# Patient Record
Sex: MALE | Race: WHITE | URBAN - METROPOLITAN AREA
[De-identification: names, ages, dates, MRNs, and addresses within clinical notes are randomized per-mention and may not be internally consistent; named-entity substitution may affect disease eponyms.]

---

## 2018-07-13 ENCOUNTER — OFFICE VISIT (OUTPATIENT)
Dept: OPHTHALMOLOGY | Facility: CLINIC | Age: 28
End: 2018-07-13
Payer: COMMERCIAL

## 2018-07-13 DIAGNOSIS — G43.109 MIGRAINE WITH AURA AND WITHOUT STATUS MIGRAINOSUS, NOT INTRACTABLE: ICD-10-CM

## 2018-07-13 DIAGNOSIS — H52.223 REGULAR ASTIGMATISM OF BOTH EYES: Primary | ICD-10-CM

## 2018-07-13 ASSESSMENT — REFRACTION_MANIFEST
OD_SPHERE: -2.00
OD_SPHERE: -2.75
OS_SPHERE: -2.50
OD_AXIS: 050
OS_CYLINDER: +2.00
OS_CYLINDER: +2.00
OS_SPHERE: -2.25
OD_SPHERE: -2.00
OD_CYLINDER: +1.50
OS_AXIS: 131
OD_AXIS: 050
OS_AXIS: 130
OD_CYLINDER: +1.75
OD_CYLINDER: +2.25
OS_CYLINDER: +2.25
OS_AXIS: 130
OD_AXIS: 049
OS_SPHERE: -2.50
METHOD_AUTOREFRACTION: 1

## 2018-07-13 ASSESSMENT — VISUAL ACUITY
OS_CC+: +2
OS_SC: 20/60
OD_CC: 20/20
OS_CC: 20/25
OD_SC+: +2
OD_SC: 20/40
METHOD: SNELLEN - LINEAR
CORRECTION_TYPE: GLASSES

## 2018-07-13 ASSESSMENT — REFRACTION_WEARINGRX
OD_SPHERE: -1.50
OS_AXIS: 130
OS_SPHERE: -2.25
OD_AXIS: 050
OD_CYLINDER: +1.25
OS_CYLINDER: +1.75
SPECS_TYPE: SVL

## 2018-07-13 ASSESSMENT — EXTERNAL EXAM - LEFT EYE: OS_EXAM: NORMAL

## 2018-07-13 ASSESSMENT — REFRACTION
OS_SPHERE: -2.00
OD_SPHERE: -1.75
OD_CYLINDER: +1.75
OD_AXIS: 049
OS_AXIS: 132
OS_CYLINDER: +2.00

## 2018-07-13 ASSESSMENT — TONOMETRY
IOP_METHOD: ICARE
OD_IOP_MMHG: 16
OS_IOP_MMHG: 14

## 2018-07-13 ASSESSMENT — CONF VISUAL FIELD
OS_NORMAL: 1
METHOD: COUNTING FINGERS
OD_NORMAL: 1

## 2018-07-13 ASSESSMENT — EXTERNAL EXAM - RIGHT EYE: OD_EXAM: NORMAL

## 2018-07-13 ASSESSMENT — CUP TO DISC RATIO
OD_RATIO: 0.2
OS_RATIO: 0.2

## 2018-07-13 ASSESSMENT — SLIT LAMP EXAM - LIDS
COMMENTS: NORMAL
COMMENTS: NORMAL

## 2018-07-13 NOTE — MR AVS SNAPSHOT
After Visit Summary   2018    Anand Hemphill    MRN: [id number]           Patient Information     Date Of Birth          1990        Visit Information        Provider Department      2018 2:20 PM Humberto Shelley, MARLEE M [facility] Ophthalmology        Today's Diagnoses     Regular astigmatism of both eyes    -  1    Migraine with aura and without status migrainosus, not intractable           Follow-ups after your visit        Follow-up notes from your care team     Return in about 1 year (around 2019) for Comprehensive Eye Exam.      Who to contact     Please call your clinic at [phone] to:    Ask questions about your health    Make or cancel appointments    Discuss your medicines    Learn about your test results    Speak to your doctor            Additional Information About Your Visit        MyChart Information     Wakozi is an electronic gateway that provides easy, online access to your medical records. With Wakozi, you can request a clinic appointment, read your test results, renew a prescription or communicate with your care team.     To sign up for Wakozi visit the website at [email or web address]   You will be asked to enter the access code listed below, as well as some personal information. Please follow the directions to create your username and password.     Your access code is: [email or web address]  Expires: 10/2/2018  6:31 AM     Your access code will  in 90 days. If you need help or a new code, please contact your [facility] or call [phone] for assistance.        Care EveryWhere ID     This is your Care EveryWhere ID. This could be used by other organizations to access your [facility] medical records  [id number]         Blood Pressure from Last 3 Encounters:   No data found for BP    Weight from Last 3 Encounters:   No data found for Wt              We Performed the Following     REFRACTION [88447]        Primary Care Provider     None Specified       No primary provider on file.        Equal Access to Services     RD [place] : Hadii aad ku haderrolaurelia Shook, walishatamara chan, vishmagnus degrootmajose luis arreola. So [facility] [phone].    ATENCIÓN: Si habla español, tiene a kendall disposición servicios gratuitos de asistencia lingüística. Llame al [phone].    We comply with applicable federal civil rights laws and [place] laws. We do not discriminate on the basis of race, color, national origin, age, disability, sex, sexual orientation, or gender identity.            Thank you!     Thank you for choosing [facility] OPHTHALMOLOGY  for your care. Our goal is always to provide you with excellent care. Hearing back from our patients is one way we can continue to improve our services. Please take a few minutes to complete the written survey that you may receive in the mail after your visit with us. Thank you!             Your Updated Medication List - Protect others around you: Learn how to safely use, store and throw away your medicines at www.disposemymeds.org.          This list is accurate as of 7/13/18  3:13 PM.  Always use your most recent med list.                   Brand Name Dispense Instructions for use Diagnosis    aspirin-acetaminophen-caffeine 250-250-65 MG per tablet    EXCEDRIN MIGRAINE     Take 1 tablet by mouth every 6 hours as needed

## 2018-07-13 NOTE — NURSING NOTE
Chief Complaints and History of Present Illnesses   Patient presents with     Blurred Vision Both Eyes     HPI    Affected eye(s):  Both   Symptoms:     Blurred vision   No floaters   No flashes   No tearing   No Dryness         Do you have eye pain now?:  No      Comments:  Patient notes that he gets migraines 4/7 days per week, distance vision is blurred.     Shruthi Reed July 13, 2018 2:18 PM

## 2018-07-13 NOTE — PROGRESS NOTES
Assessment/Plan  (H52.223) Regular astigmatism of both eyes  (primary encounter diagnosis)  Comment: Myopic astigmatism OU  Plan: REFRACTION [93377]         Educated patient on condition and clinical findings. Dispensed spectacle prescription for full time wear. Educated patient on possibility of adaptation period, if symptoms do not improve return to clinic for further testing.    (G43.109) Migraine with aura and without status migrainosus, not intractable  Comment: Longstanding, controlled with Excedrin  Plan:  Recommended talking with primary care provider if symptoms persist. May be improved with prescription glasses.    Return to clinic in 1 year for comprehensive eye exam.    Complete documentation of historical and exam elements from today's encounter can  be found in the full encounter summary report (not reduplicated in this progress  note). I personally obtained the chief complaint(s) and history of present illness. I  confirmed and edited as necessary the review of systems, past medical/surgical  history, family history, social history, and examination findings as documented by  others; and I examined the patient myself. I personally reviewed the relevant tests,  images, and reports as documented above. I formulated and edited as necessary the  assessment and plan and discussed the findings and management plan with the  patient and family.    Humberto Shelley, MARLEE, FAAO

## 2019-01-14 ENCOUNTER — ANCILLARY PROCEDURE (OUTPATIENT)
Dept: MRI IMAGING | Facility: CLINIC | Age: 29
End: 2019-01-14
Payer: COMMERCIAL

## 2019-01-14 ENCOUNTER — OFFICE VISIT (OUTPATIENT)
Dept: ORTHOPEDICS | Facility: CLINIC | Age: 29
End: 2019-01-14
Payer: COMMERCIAL

## 2019-01-14 ENCOUNTER — ANCILLARY PROCEDURE (OUTPATIENT)
Dept: GENERAL RADIOLOGY | Facility: CLINIC | Age: 29
End: 2019-01-14
Payer: COMMERCIAL

## 2019-01-14 VITALS — BODY MASS INDEX: 29.06 KG/M2 | WEIGHT: 203 LBS | RESPIRATION RATE: 16 BRPM | HEIGHT: 70 IN

## 2019-01-14 DIAGNOSIS — M25.561 PAIN AND SWELLING OF RIGHT KNEE: Primary | ICD-10-CM

## 2019-01-14 DIAGNOSIS — M25.461 PAIN AND SWELLING OF RIGHT KNEE: ICD-10-CM

## 2019-01-14 DIAGNOSIS — M25.461 PAIN AND SWELLING OF RIGHT KNEE: Primary | ICD-10-CM

## 2019-01-14 DIAGNOSIS — M25.561 PAIN AND SWELLING OF RIGHT KNEE: ICD-10-CM

## 2019-01-14 RX ADMIN — LIDOCAINE HYDROCHLORIDE 3 ML: 10 INJECTION, SOLUTION EPIDURAL; INFILTRATION; INTRACAUDAL; PERINEURAL at 16:37

## 2019-01-14 ASSESSMENT — MIFFLIN-ST. JEOR: SCORE: 1897.05

## 2019-01-14 NOTE — PROGRESS NOTES
Large Joint Injection/Arthocentesis: R knee joint  Date/Time: 1/14/2019 4:37 PM  Performed by: Erick Gibbons MD  Authorized by: Erick Gibbons MD     Indications:  Pain and joint swelling  Needle Size:  18 G  Approach:  Superolateral  Location:  Knee  Site:  R knee joint  Medications:  3 mL lidocaine (PF) 1 %  Aspirate amount (mL):  10  Aspirate:  Bloody  Outcome:  Tolerated well, no immediate complications  Procedure discussed: discussed risks, benefits, and alternatives    Consent Given by:  Patient  Timeout: timeout called immediately prior to procedure    Prep: patient was prepped and draped in usual sterile fashion     landmarks were identified and marked with a pen.  Area was cleaned with chlorhexidine swab. 3 ML lidocaine was used for local anesthesia in the superior lateral portion of the knee.  An 18-gauge needle was easily introduced into the knee joint aspiration resulted in 10 mL blood.  The area was covered with a bandage and wrapped in Ace wrap.  Blood loss minimal.  Patient tolerated procedure well.  Given routine post injection instructions.    Erick Gibbons MD
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
## 2019-01-14 NOTE — LETTER
Date:January 16, 2019      Patient was self referred, no letter generated. Do not send.        [facility] Information

## 2019-01-14 NOTE — LETTER
Patient:  Anand Hemphill  :   1990  MRN:     [id number]      January 15, 2019    To whom it may concern:    Anand Hemphill is under my professional care for   Right knee fracture and may return to work on 1/15/2019   with the following restrictions:     -Will need to use crutches and immobilizer for the next 4 weeks.       Please contact our office with questions or concerns.     Sincerely,        Erick Gibbons MD
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
## 2019-01-15 ENCOUNTER — TELEPHONE (OUTPATIENT)
Dept: ORTHOPEDICS | Facility: CLINIC | Age: 29
End: 2019-01-15

## 2019-01-15 DIAGNOSIS — M25.561 PAIN OF RIGHT KNEE AFTER INJURY: Primary | ICD-10-CM

## 2019-01-15 RX ORDER — LIDOCAINE HYDROCHLORIDE 10 MG/ML
3 INJECTION, SOLUTION EPIDURAL; INFILTRATION; INTRACAUDAL; PERINEURAL
Status: SHIPPED | OUTPATIENT
Start: 2019-01-14

## 2019-01-15 RX ORDER — OXYCODONE AND ACETAMINOPHEN 5; 325 MG/1; MG/1
1-2 TABLET ORAL EVERY 6 HOURS PRN
Qty: 15 TABLET | Refills: 0 | Status: SHIPPED | OUTPATIENT
Start: 2019-01-15 | End: 2019-01-18

## 2019-01-15 NOTE — TELEPHONE ENCOUNTER
[facility] Call Center    Phone Message    May a detailed message be left on voicemail: yes    Reason for Call: Requesting Results   Name/type of test: MRI   Date of test: 1/14/19  Was test done at a location other than [facility] ?: No,   Pt is also  in a lot of pain and stated he is probably taking to much ibuprofen, and would like to know what he can do for pain management.      Action Taken: Message routed to:  [facility] walk [facility] ortho

## 2019-01-15 NOTE — TELEPHONE ENCOUNTER
[facility] Call Center    Phone Message    May a detailed message be left on voicemail: yes    Reason for Call: Other: Pt requested another message be sent over, pt needs to know what to do for his knee pain for work, as well as his MRI results     Action Taken: Message routed to:  [facility] ortho walk in

## 2019-01-15 NOTE — TELEPHONE ENCOUNTER
Discussed results with patient over the phone. Recommended crutches and immobilizer. Given letter for work. Will come by clinic to  crutches and immobilizer as well as rx for percocet.

## 2019-01-22 ENCOUNTER — COMMUNICATION - HEALTHEAST (OUTPATIENT)
Dept: FAMILY MEDICINE | Facility: CLINIC | Age: 29
End: 2019-01-22

## 2019-01-23 DIAGNOSIS — M25.561 PAIN AND SWELLING OF RIGHT KNEE: Primary | ICD-10-CM

## 2019-01-23 DIAGNOSIS — M25.461 PAIN AND SWELLING OF RIGHT KNEE: Primary | ICD-10-CM

## 2019-01-23 RX ORDER — OXYCODONE AND ACETAMINOPHEN 5; 325 MG/1; MG/1
1-2 TABLET ORAL EVERY 6 HOURS PRN
Qty: 15 TABLET | Refills: 0 | Status: SHIPPED | OUTPATIENT
Start: 2019-01-23

## 2019-01-23 RX ORDER — OXYCODONE AND ACETAMINOPHEN 5; 325 MG/1; MG/1
1-2 TABLET ORAL EVERY 6 HOURS PRN
Qty: 60 TABLET | Refills: 0 | Status: SHIPPED | OUTPATIENT
Start: 2019-01-23 | End: 2019-01-23

## 2019-02-08 ENCOUNTER — TELEPHONE (OUTPATIENT)
Dept: ORTHOPEDICS | Facility: CLINIC | Age: 29
End: 2019-02-08

## 2019-02-14 ENCOUNTER — OFFICE VISIT (OUTPATIENT)
Dept: ORTHOPEDICS | Facility: CLINIC | Age: 29
End: 2019-02-14
Payer: COMMERCIAL

## 2019-02-14 ENCOUNTER — TELEPHONE (OUTPATIENT)
Dept: ORTHOPEDICS | Facility: CLINIC | Age: 29
End: 2019-02-14

## 2019-02-14 VITALS — HEIGHT: 70 IN | WEIGHT: 203 LBS | HEART RATE: 85 BPM | BODY MASS INDEX: 29.06 KG/M2

## 2019-02-14 DIAGNOSIS — S72.434D CLOSED NONDISPLACED FRACTURE OF MEDIAL CONDYLE OF RIGHT FEMUR WITH ROUTINE HEALING, SUBSEQUENT ENCOUNTER: Primary | ICD-10-CM

## 2019-02-14 ASSESSMENT — MIFFLIN-ST. JEOR: SCORE: 1897.05

## 2019-02-14 NOTE — TELEPHONE ENCOUNTER
Letter written and emailed to provided address. Attempted to contact pt to let him know but unable to leave VM.

## 2019-02-14 NOTE — TELEPHONE ENCOUNTER
[facility] Call Center    Phone Message    May a detailed message be left on voicemail: yes    Reason for Call: Form or Letter   Type or form/letter needing completion: Return to work notice- statement indicating that it is ok to return to work.  Provider: Dr. Gibbons  Date form needed: ASAP  Once completed: Fax form to: Pt prefers email [email or web address]      Action Taken: Message routed to:  [facility] ump ortho walk in clinic
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
## 2019-02-14 NOTE — PROGRESS NOTES
SPORTS & ORTHOPEDIC WALK-IN FOLLOW-UP VISIT 2/14/2019    Interval History:     Follow up reason: R knee- medial femoral condyle impaction fracture    Date of injury: 1/12/19 fall    Date last seen: 1/14/19    Following Therapeutic Plan: Has not been using crutches for awhile d/t improving pain.     Pain: Improving    Function: Improving    Interval History:      Medical History:    Any recent changes to your medical history? No    Any new medication prescribed since last visit? No    Review of Systems:    Do you have fever, chills, weight loss? No    Do you have any vision problems? No    Do you have any chest pain or edema? No    Do you have any shortness of breath or wheezing?  No    Do you have stomach problems? No    Do you have any numbness or focal weakness? No    Do you have diabetes? No    Do you have problems with bleeding or clotting? No    Do you have an rashes or other skin lesions? No

## 2019-02-14 NOTE — LETTER
[facility] ORTHOPAEDIC CLINIC  [address] [place] [facility] [address]          February 14, 2019    RE:  Anand Hemphill                                                                                                                                                       [address]  [place] [address]            To whom it may concern:    Anand Hemphill is under my professional care for a right knee injury. He is able to return to work, but should avoid any running, jumping, or high impact activity. Please contact our clinic with any questions or concerns.       Sincerely,        Erick Gibbons MD

## 2019-04-02 ENCOUNTER — OFFICE VISIT (OUTPATIENT)
Dept: OPHTHALMOLOGY | Facility: CLINIC | Age: 29
End: 2019-04-02
Payer: COMMERCIAL

## 2019-04-02 DIAGNOSIS — H52.223 REGULAR ASTIGMATISM OF BOTH EYES: Primary | ICD-10-CM

## 2019-04-02 ASSESSMENT — REFRACTION_CURRENTRX
OD_CYLINDER: -1.25
OS_AXIS: 040
OD_BASECURVE: 8.7
OD_DIAMETER: 14.5
OD_BRAND: COOPER BIOFINITY TORIC BC 8.7, D 14.5
OS_DIAMETER: 14.5
OS_BRAND: COOPER BIOFINITY TORIC BC 8.7, D 14.5
OS_CYLINDER: -1.75
OD_SPHERE: -0.50
OS_SPHERE: -0.50
OS_BASECURVE: 8.7
OD_AXIS: 140

## 2019-04-02 ASSESSMENT — VISUAL ACUITY
METHOD: SNELLEN - LINEAR
OS_CC+: -1
OS_CC: 20/20
CORRECTION_TYPE: CONTACTS
OD_CC+: -2
OD_CC: 20/20

## 2019-04-02 ASSESSMENT — TONOMETRY: IOP_UNABLETOASSESS: 1

## 2019-04-02 NOTE — PROGRESS NOTES
History  HPI     Contact Lens Evaluation     In both eyes.  Associated symptoms include Negative for dryness, eye pain and tearing.  Pain was noted as 0/10.              Comments     Shruthi Reed April 2, 2019 11:43 AM            Last edited by Tamara Reed on 4/2/2019 11:43 AM. (History)          Assessment/Plan  (H52.223) Regular astigmatism of both eyes  (primary encounter diagnosis)  Comment: Myopic astigmatism both eyes   Plan: HC CONTACT LENS FITTING COSMETIC LVL 1 (43404.011)         Dispensed trial lenses and finalized contact lens prescription for 2 years.    Return to clinic as needed for contact lens follow-up.    Contact Lens Billing  V-Code:  - Soft toric  Final Contact Lens Rx       Brand Base Curve Diameter Sphere Cylinder Axis    Right Magdiel Biofinity Toric BC 8.7, D 14.5 8.7 14.5 -0.50 -1.25 140    Left Magdiel Biofinity Toric BC 8.7, D 14.5 8.7 14.5 -0.25 -1.75 040    Expiration Date:  4/2/2021    Replacement:  Monthly    Wearing Schedule:  Daily wear         CL Fitting Fee: $75    These are for cosmetic contact lenses.    Encounter Diagnosis   Name Primary?     Regular astigmatism of both eyes Yes        Complete documentation of historical and exam elements from today's encounter can  be found in the full encounter summary report (not reduplicated in this progress  note). I personally obtained the chief complaint(s) and history of present illness. I  confirmed and edited as necessary the review of systems, past medical/surgical  history, family history, social history, and examination findings as documented by  others; and I examined the patient myself. I personally reviewed the relevant tests,  images, and reports as documented above. I formulated and edited as necessary the  assessment and plan and discussed the findings and management plan with the  patient and family.    Humberto Shelley, OD, FAAO

## 2019-04-02 NOTE — NURSING NOTE
Chief Complaints and History of Present Illnesses   Patient presents with     Contact Lens Evaluation     Chief Complaint(s) and History of Present Illness(es)     Contact Lens Evaluation     Laterality: both eyes    Associated symptoms: Negative for dryness, eye pain and tearing    Pain scale: 0/10              Comments     Shruthi Reed April 2, 2019 11:43 AM

## 2019-10-24 ENCOUNTER — OFFICE VISIT (OUTPATIENT)
Dept: OPHTHALMOLOGY | Facility: CLINIC | Age: 29
End: 2019-10-24

## 2019-10-24 DIAGNOSIS — H52.13 MYOPIA OF BOTH EYES: Primary | ICD-10-CM

## 2019-10-24 NOTE — PROGRESS NOTES
Contact Lens Billing  V-Code:  - Soft toric     # of units: 2  Price per Unit: $70  Total $140     [facility] Order #[id number]    These are for cosmetic contact lenses.    Encounter Diagnosis   Name Primary?     Myopia of both eyes Yes                   Final Contact Lens Rx        Brand Base Curve Diameter Sphere Cylinder Axis     Right Magdiel Biofinity Toric BC 8.7, D 14.5 8.7 14.5 -0.50 -1.25 140     Left Magdiel Biofinity Toric BC 8.7, D 14.5 8.7 14.5 -0.25 -1.75 040     Expiration Date:  4/2/2021     Replacement:  Monthly     Wearing Schedule:  Daily wear     Date of last eye exam: 4/2/2019    BALTA Carrasco COT 8:48 AM October 24, 2019

## 2020-01-08 ENCOUNTER — OFFICE VISIT - HEALTHEAST (OUTPATIENT)
Dept: FAMILY MEDICINE | Facility: CLINIC | Age: 30
End: 2020-01-08

## 2020-01-08 DIAGNOSIS — Z72.0 TOBACCO ABUSE: ICD-10-CM

## 2020-01-08 DIAGNOSIS — F41.1 GAD (GENERALIZED ANXIETY DISORDER): ICD-10-CM

## 2020-01-08 LAB
ANION GAP SERPL CALCULATED.3IONS-SCNC: 12 MMOL/L (ref 5–18)
BASOPHILS # BLD AUTO: 0 THOU/UL (ref 0–0.2)
BASOPHILS NFR BLD AUTO: 1 % (ref 0–2)
BUN SERPL-MCNC: 16 MG/DL (ref 8–22)
CALCIUM SERPL-MCNC: 9.6 MG/DL (ref 8.5–10.5)
CHLORIDE BLD-SCNC: 107 MMOL/L (ref 98–107)
CO2 SERPL-SCNC: 21 MMOL/L (ref 22–31)
CREAT SERPL-MCNC: 0.83 MG/DL (ref 0.7–1.3)
EOSINOPHIL # BLD AUTO: 0.2 THOU/UL (ref 0–0.4)
EOSINOPHIL NFR BLD AUTO: 3 % (ref 0–6)
ERYTHROCYTE [DISTWIDTH] IN BLOOD BY AUTOMATED COUNT: 11.3 % (ref 11–14.5)
GFR SERPL CREATININE-BSD FRML MDRD: >60 ML/MIN/1.73M2
GLUCOSE BLD-MCNC: 81 MG/DL (ref 70–125)
HCT VFR BLD AUTO: 51.4 % (ref 40–54)
HGB BLD-MCNC: 17.1 G/DL (ref 14–18)
LYMPHOCYTES # BLD AUTO: 2.1 THOU/UL (ref 0.8–4.4)
LYMPHOCYTES NFR BLD AUTO: 33 % (ref 20–40)
MCH RBC QN AUTO: 31.2 PG (ref 27–34)
MCHC RBC AUTO-ENTMCNC: 33.3 G/DL (ref 32–36)
MCV RBC AUTO: 94 FL (ref 80–100)
MONOCYTES # BLD AUTO: 0.7 THOU/UL (ref 0–0.9)
MONOCYTES NFR BLD AUTO: 12 % (ref 2–10)
NEUTROPHILS # BLD AUTO: 3.2 THOU/UL (ref 2–7.7)
NEUTROPHILS NFR BLD AUTO: 51 % (ref 50–70)
PLATELET # BLD AUTO: 190 THOU/UL (ref 140–440)
PMV BLD AUTO: 8.6 FL (ref 7–10)
POTASSIUM BLD-SCNC: 4.5 MMOL/L (ref 3.5–5)
RBC # BLD AUTO: 5.48 MILL/UL (ref 4.4–6.2)
SODIUM SERPL-SCNC: 140 MMOL/L (ref 136–145)
TSH SERPL DL<=0.005 MIU/L-ACNC: 0.67 UIU/ML (ref 0.3–5)
WBC: 6.2 THOU/UL (ref 4–11)

## 2020-01-08 ASSESSMENT — MIFFLIN-ST. JEOR: SCORE: 1993.65

## 2020-01-08 ASSESSMENT — PATIENT HEALTH QUESTIONNAIRE - PHQ9: SUM OF ALL RESPONSES TO PHQ QUESTIONS 1-9: 6

## 2020-01-08 ASSESSMENT — ANXIETY QUESTIONNAIRES
3. WORRYING TOO MUCH ABOUT DIFFERENT THINGS: MORE THAN HALF THE DAYS
GAD7 TOTAL SCORE: 18
5. BEING SO RESTLESS THAT IT IS HARD TO SIT STILL: NEARLY EVERY DAY
1. FEELING NERVOUS, ANXIOUS, OR ON EDGE: MORE THAN HALF THE DAYS
4. TROUBLE RELAXING: NEARLY EVERY DAY
7. FEELING AFRAID AS IF SOMETHING AWFUL MIGHT HAPPEN: MORE THAN HALF THE DAYS
IF YOU CHECKED OFF ANY PROBLEMS ON THIS QUESTIONNAIRE, HOW DIFFICULT HAVE THESE PROBLEMS MADE IT FOR YOU TO DO YOUR WORK, TAKE CARE OF THINGS AT HOME, OR GET ALONG WITH OTHER PEOPLE: SOMEWHAT DIFFICULT
6. BECOMING EASILY ANNOYED OR IRRITABLE: NEARLY EVERY DAY
2. NOT BEING ABLE TO STOP OR CONTROL WORRYING: NEARLY EVERY DAY

## 2020-01-09 ENCOUNTER — COMMUNICATION - HEALTHEAST (OUTPATIENT)
Dept: FAMILY MEDICINE | Facility: CLINIC | Age: 30
End: 2020-01-09

## 2020-04-22 ENCOUNTER — OFFICE VISIT (OUTPATIENT)
Dept: OPHTHALMOLOGY | Facility: CLINIC | Age: 30
End: 2020-04-22
Payer: COMMERCIAL

## 2020-04-22 DIAGNOSIS — H52.13 MYOPIA OF BOTH EYES: Primary | ICD-10-CM

## 2020-04-22 NOTE — PROGRESS NOTES
No office visit. Bill contact lens    Contact Lens Billing  V-Code:  - Soft toric     # of units: 2  Price per Unit: $70  Total $140     Shipping Waived    [facility] order # [id number]    These are for cosmetic contact lenses.    Encounter Diagnosis   Name Primary?     Myopia of both eyes Yes        Date of last eye exam: 4/2/2019                        Final Contact Lens Rx        Brand Base Curve Diameter Sphere Cylinder Axis      Right Magdiel Biofinity Toric BC 8.7, D 14.5 8.7 14.5 -0.50 -1.25 140      Left Magdiel Biofinity Toric BC 8.7, D 14.5 8.7 14.5 -0.25 -1.75 040      Expiration Date:  4/2/2021     Replacement:  Monthly     Wearing Schedule:  Daily wear     Stacy Dorado COT 8:21 AM April 22, 2020

## 2020-09-04 ENCOUNTER — OFFICE VISIT (OUTPATIENT)
Dept: OPHTHALMOLOGY | Facility: CLINIC | Age: 30
End: 2020-09-04

## 2020-09-04 DIAGNOSIS — H52.13 MYOPIA OF BOTH EYES: Primary | ICD-10-CM

## 2020-09-04 NOTE — Clinical Note
Raul Vasquez,     First time doing this type of encounter, I hope it looks all correct to you, please bill patient for CTL.  Call if you have any questions or see an error, my number is [phone]    Thanks!  Shruthi GIFFORD September 4, 2020 12:05 PM

## 2020-09-04 NOTE — PROGRESS NOTES
No office visit. Bill contact lens    Contact Lens Billing  V-Code:  - Soft toric     # of units: 2   Price per Unit: $70  Total: $140    Shipping Waived    [facility] order # [id number]    These are for cosmetic contact lenses.     Encounter Diagnosis   Name Primary?     Myopia of both eyes - Both Eyes Yes      Date of last eye exam: 04/02/2019    Final Contact Lens RX:  Right: Magdiel Biofinity Toric, BC: 8.7, Diameter: 14.5, -0.50-1.25x140  Left: Magdiel Biofinity Toric, BC: 8.7, Diameter: 14.5, -0.25-1.75x040  Expiration: 04/02/2021  Replacement: monthly  Wearing Schedule: daily wear    Shruthi Reed COT September 4, 2020 12:02 PM

## 2021-02-03 ENCOUNTER — OFFICE VISIT (OUTPATIENT)
Dept: OPHTHALMOLOGY | Facility: CLINIC | Age: 31
End: 2021-02-03

## 2021-02-03 DIAGNOSIS — H52.13 MYOPIA OF BOTH EYES: Primary | ICD-10-CM

## 2021-02-03 DIAGNOSIS — H52.229 REGULAR ASTIGMATISM: ICD-10-CM

## 2021-02-03 PROCEDURE — V2521 CNTCT LENS HYDROPHILIC TORIC: HCPCS | Performed by: OPTOMETRIST

## 2021-02-04 NOTE — PROGRESS NOTES
No office visit. CL order only.    Contact Lens Billing  V-Code:  - Soft toric     # of units: 2  Price per Unit: $70  Total: $140    [facility] order number: [id number]    These are for cosmetic contact lenses.    Encounter Diagnoses   Name Primary?     Myopia of both eyes Yes     Regular astigmatism         Contact Lens Billing  V-Code:  - Soft toric             Final Contact Lens Rx        Brand Base Curve Diameter Sphere Cylinder Axis     Right Magdiel Biofinity Toric BC 8.7, D 14.5 8.7 14.5 -0.50 -1.25 140     Left Magdiel Biofinity Toric BC 8.7, D 14.5 8.7 14.5 -0.25 -1.75 040     Expiration Date:  4/2/2021     Replacement:  Monthly     Wearing Schedule:  Daily wear       Date of last eye exam: 4/2/2019.    BALTA Carrasco COT 11:45 AM February 4, 2021

## 2021-05-27 ASSESSMENT — PATIENT HEALTH QUESTIONNAIRE - PHQ9: SUM OF ALL RESPONSES TO PHQ QUESTIONS 1-9: 6

## 2021-05-28 ASSESSMENT — ANXIETY QUESTIONNAIRES: GAD7 TOTAL SCORE: 18

## 2021-06-03 ENCOUNTER — TELEPHONE (OUTPATIENT)
Dept: OPHTHALMOLOGY | Facility: CLINIC | Age: 31
End: 2021-06-03

## 2021-06-04 VITALS
WEIGHT: 223 LBS | BODY MASS INDEX: 31.22 KG/M2 | OXYGEN SATURATION: 97 % | HEART RATE: 81 BPM | DIASTOLIC BLOOD PRESSURE: 66 MMHG | SYSTOLIC BLOOD PRESSURE: 137 MMHG | HEIGHT: 71 IN

## 2021-06-05 NOTE — PROGRESS NOTES
Assessment:   1. BALTAZAR (generalized anxiety disorder)  Discussed diagnosis and possible treatment options with patient.  Answered questions.  - Basic metabolic panel  - CBC and differential  - Thyroid [place]  - Ambulatory referral to Psychology  - HM1 (CBC with Diff)  - ALPRAZolam (XANAX) 0.5 MG tablet; Take 1 tablet (0.5 mg total) by mouth 3 (three) times a day as needed for sleep or anxiety.  Dispense: 10 tablet; Refill: 0  - buPROPion (WELLBUTRIN XL) 150 MG 24 hr tablet; Take 1 tablet (150 mg total) by mouth every morning.  Dispense: 7 tablet; Refill: 2  - buPROPion (WELLBUTRIN XL) 300 MG 24 hr tablet; Take 1 tablet (300 mg total) by mouth every morning.  Dispense: 30 tablet; Refill: 2    2. Tobacco abuse  Discussed diagnosis and treatment options with patient and start treatment.  - buPROPion (WELLBUTRIN XL) 150 MG 24 hr tablet; Take 1 tablet (150 mg total) by mouth every morning.  Dispense: 7 tablet; Refill: 2  - buPROPion (WELLBUTRIN XL) 300 MG 24 hr tablet; Take 1 tablet (300 mg total) by mouth every morning.  Dispense: 30 tablet; Refill: 2    Plan:   Medications: Wellbutrin and Xanax.  Labs: Comprehensive metabolic profile, CBC and TSH.  Recommended counseling.  Reviewed concept of anxiety as biochemical imbalance of neurotransmitters and rationale for treatment.  Instructed patient to contact office or on-call physician promptly should condition worsen or any new symptoms appear and provided on-call telephone numbers. IF THE PATIENT HAS ANY SUICIDAL OR HOMICIDAL IDEATIONS, CALL THE OFFICE, DISCUSS WITH A SUPPORT MEMBER, OR GO TO THE ER IMMEDIATELY. Patient was agreeable with this plan.  Follow up: 2 weeks.  Spent 45 minutes (>50% of visit) discussing the risks of anxiety disorder, the  pathophysiology, etiology, risks, and principles of treatment.     Subjective:   Anand Hemphill is a 30 y.o. male who presents for new evaluation and treatment of anxiety disorder. He has the following anxiety symptoms: chest  "pain, difficulty concentrating, insomnia, irritable, palpitations, panic attacks, shortness of breath and sweating. Onset of symptoms was approximately 3 years ago. Symptoms have been gradually worsening since that time. He reports that he recently got promoted at work which has been stressful. He also reports that his his neighbors are dealing drugs and every time he is trying to sleep people are going in and out of the house which is increasing his anxiety.  He denies current suicidal and homicidal ideation. Family history significant for anxiety. Risk factors: positive family history in  mother. Previous treatment includes Xanax. He complains of the following medication side effects: none. Patient also reports that he will like to quit smoking, he smokes one and half packs a day.    The following portions of the patient's history were reviewed and updated as appropriate: allergies, current medications, past family history, past medical history, past social history, past surgical history and problem list.    Review of Systems  A 12 point comprehensive review of systems was negative except as noted.      Objective:   /66   Pulse 81   Ht 5' 11\" (1.803 m)   Wt (!) 223 lb (101.2 kg)   SpO2 97%   BMI 31.10 kg/m    General appearance: alert, appears stated age and cooperative  Pulses: 2+ and symmetric  Skin: Skin color, texture, turgor normal. No rashes or lesions  Lymph nodes: Cervical, supraclavicular, and axillary nodes normal.  Neurologic: Grossly normal      "

## 2021-06-05 NOTE — PATIENT INSTRUCTIONS - HE
2020     Dear Anand Hemphill:    Thank you for your interest in [employer]. [employer] has a new electronic health record to help you manage your health information using your computer or the [employer] Tanika.    With [employer] you can review instructions from your health care provider, send a secure message to your provider, view test results, manage your appointments and more.    How Do I Sign Up?  1. In your Internet browser, go to [email or web address]  2. Click on Sign Up Now   3. Enter your [employer] Activation Code exactly as it appears below. This code will  14 days after it is generated. You will not need to use this code after you have completed the sign-up process. If you do not sign up before the expiration date, you must request a new code.   [employer] Activation Code: [email or web address]  Expires: 3/8/2020 11:09 AM  4. Create a [employer] username. Think of one that is secure and easy to remember.  Your username must be between 6 and 20 characters.  5. Create a [employer] password. You can change your password at any time. Your password must be between 8 and 20 characters and contain at least one letter and one number.  6. Choose a security question, enter your answer, and click Next. This can be used to access [employer] if you forget your password.   7. Enter a valid e-mail address to receive e-mail notifications when new information is available in [employer].  8. Click Sign In.     Additional Information  If you have questions, visit [email or web address], e-mail [email or web address] or call [phone] to talk to our [employer] staff. Remember, [employer] is NOT to be used for urgent needs. For medical emergencies, dial 911.

## 2021-06-16 PROBLEM — Z72.0 TOBACCO ABUSE: Status: ACTIVE | Noted: 2020-01-08

## 2021-06-20 NOTE — LETTER
Letter by Brittani Palomino FNP at      Author: Brittani Palomino FNP Service: -- Author Type: --    Filed:  Encounter Date: 1/9/2020 Status: Signed         Anand Hemphill  [address] [place] [address]             January 9, 2020         Dear Mr. Hemphill,    Below are the results from your recent visit:    Resulted Orders   Basic metabolic panel   Result Value Ref Range    Sodium 140 136 - 145 mmol/L    Potassium 4.5 3.5 - 5.0 mmol/L    Chloride 107 98 - 107 mmol/L    CO2 21 (L) 22 - 31 mmol/L    Anion Gap, Calculation 12 5 - 18 mmol/L    Glucose 81 70 - 125 mg/dL    Calcium 9.6 8.5 - 10.5 mg/dL    BUN 16 8 - 22 mg/dL    Creatinine 0.83 0.70 - 1.30 mg/dL    GFR MDRD Af Amer >60 >60 mL/min/1.73m2    GFR MDRD Non Af Amer >60 >60 mL/min/1.73m2    Narrative    Fasting Glucose reference range is 70-99 mg/dL per  American Diabetes Association (ADA) guidelines.   Thyroid [place]   Result Value Ref Range    TSH 0.67 0.30 - 5.00 uIU/mL   HM1 (CBC with Diff)   Result Value Ref Range    WBC 6.2 4.0 - 11.0 thou/uL    RBC 5.48 4.40 - 6.20 mill/uL    Hemoglobin 17.1 14.0 - 18.0 g/dL    Hematocrit 51.4 40.0 - 54.0 %    MCV 94 80 - 100 fL    MCH 31.2 27.0 - 34.0 pg    MCHC 33.3 32.0 - 36.0 g/dL    RDW 11.3 11.0 - 14.5 %    Platelets 190 140 - 440 thou/uL    MPV 8.6 7.0 - 10.0 fL    Neutrophils % 51 50 - 70 %    Lymphocytes % 33 20 - 40 %    Monocytes % 12 (H) 2 - 10 %    Eosinophils % 3 0 - 6 %    Basophils % 1 0 - 2 %    Neutrophils Absolute 3.2 2.0 - 7.7 thou/uL    Lymphocytes Absolute 2.1 0.8 - 4.4 thou/uL    Monocytes Absolute 0.7 0.0 - 0.9 thou/uL    Eosinophils Absolute 0.2 0.0 - 0.4 thou/uL    Basophils Absolute 0.0 0.0 - 0.2 thou/uL       Normal lab result. Continue to exercise regularly and consume heart healthy diet.     Please call with questions or contact us using [employer].    Sincerely,        Electronically signed by THELMA Mello

## 2021-06-23 NOTE — TELEPHONE ENCOUNTER
Attempted to contact patient and inform him Dr Cisneros is not in Private Practice and cannot be his primary care MD.  Received message asking for a mail box number.  Unable to leave a message at this time.  Will try again later.

## 2021-06-23 NOTE — TELEPHONE ENCOUNTER
Who is calling:  Patient   Reason for Call:  Patient called to set up an appointment with Dr. Mark Cisneros. Writer told patient that Dr. [facility] and could not set an appointment to establish with Dr. Cisneros at [facility]. Patient states that his girlfriend saw Dr. Cisneros at [facility] last weekend and recommended him for patient. Patient would like to know if Dr. Cisneros has a private practice he can schedule at. Please advise.   Date of last appointment with primary care: n/a  Has the patient been recently seen:  No  Okay to leave a detailed message: Yes

## (undated) RX ORDER — LIDOCAINE HYDROCHLORIDE 10 MG/ML
INJECTION, SOLUTION EPIDURAL; INFILTRATION; INTRACAUDAL; PERINEURAL
Status: DISPENSED
Start: 2019-01-14